# Patient Record
Sex: FEMALE | Race: WHITE | NOT HISPANIC OR LATINO | URBAN - METROPOLITAN AREA
[De-identification: names, ages, dates, MRNs, and addresses within clinical notes are randomized per-mention and may not be internally consistent; named-entity substitution may affect disease eponyms.]

---

## 2023-03-14 PROBLEM — R92.8 ABNORMAL MAMMOGRAM: Status: ACTIVE | Noted: 2023-03-14

## 2023-03-14 PROBLEM — F32.A DEPRESSION: Status: ACTIVE | Noted: 2023-03-14

## 2023-03-14 PROBLEM — F43.21 GRIEF REACTION: Status: ACTIVE | Noted: 2023-03-14

## 2023-03-14 PROBLEM — J45.909 ASTHMATIC BRONCHITIS (HHS-HCC): Status: ACTIVE | Noted: 2023-03-14

## 2023-03-14 PROBLEM — J32.9 CHRONIC SINUSITIS: Status: ACTIVE | Noted: 2023-03-14

## 2023-03-14 PROBLEM — J02.9 PHARYNGITIS: Status: ACTIVE | Noted: 2023-03-14

## 2023-03-14 PROBLEM — G47.00 INSOMNIA: Status: ACTIVE | Noted: 2023-03-14

## 2023-03-14 PROBLEM — N76.0 VAGINITIS: Status: ACTIVE | Noted: 2023-03-14

## 2023-03-14 PROBLEM — R63.4 WEIGHT LOSS, UNINTENTIONAL: Status: ACTIVE | Noted: 2023-03-14

## 2023-03-14 PROBLEM — M25.562 LEFT KNEE PAIN: Status: ACTIVE | Noted: 2023-03-14

## 2023-03-14 PROBLEM — R59.0 CERVICAL LYMPHADENOPATHY: Status: ACTIVE | Noted: 2023-03-14

## 2023-03-14 PROBLEM — F43.20 GRIEF REACTION: Status: ACTIVE | Noted: 2023-03-14

## 2023-03-14 PROBLEM — F41.9 ANXIETY: Status: ACTIVE | Noted: 2023-03-14

## 2023-03-14 PROBLEM — H69.93 DYSFUNCTION OF BOTH EUSTACHIAN TUBES: Status: ACTIVE | Noted: 2023-03-14

## 2023-03-14 PROBLEM — L98.9 CHANGING SKIN LESION: Status: ACTIVE | Noted: 2023-03-14

## 2023-03-14 PROBLEM — N39.3 STRESS INCONTINENCE, FEMALE: Status: ACTIVE | Noted: 2023-03-14

## 2023-03-14 PROBLEM — J11.1 INFLUENZA: Status: ACTIVE | Noted: 2023-03-14

## 2023-03-14 RX ORDER — CITALOPRAM 20 MG/1
0.5 TABLET, FILM COATED ORAL DAILY
COMMUNITY
Start: 2020-08-26 | End: 2024-04-18 | Stop reason: SDUPTHER

## 2023-03-14 RX ORDER — ZOLPIDEM TARTRATE 10 MG/1
1 TABLET ORAL NIGHTLY PRN
COMMUNITY
End: 2023-03-15 | Stop reason: SDUPTHER

## 2023-03-14 RX ORDER — FLUTICASONE PROPIONATE 50 MCG
2 SPRAY, SUSPENSION (ML) NASAL DAILY
COMMUNITY
Start: 2018-01-18

## 2023-03-14 RX ORDER — OXYBUTYNIN CHLORIDE 5 MG/1
5 TABLET, EXTENDED RELEASE ORAL DAILY
COMMUNITY
Start: 2022-01-05 | End: 2023-03-15 | Stop reason: SDUPTHER

## 2023-03-15 ENCOUNTER — OFFICE VISIT (OUTPATIENT)
Dept: PRIMARY CARE | Facility: CLINIC | Age: 63
End: 2023-03-15
Payer: COMMERCIAL

## 2023-03-15 VITALS
TEMPERATURE: 97.5 F | BODY MASS INDEX: 25.83 KG/M2 | WEIGHT: 155 LBS | HEIGHT: 65 IN | SYSTOLIC BLOOD PRESSURE: 138 MMHG | DIASTOLIC BLOOD PRESSURE: 88 MMHG | OXYGEN SATURATION: 97 % | HEART RATE: 94 BPM

## 2023-03-15 DIAGNOSIS — F51.01 PRIMARY INSOMNIA: Primary | ICD-10-CM

## 2023-03-15 DIAGNOSIS — F41.9 ANXIETY: ICD-10-CM

## 2023-03-15 DIAGNOSIS — N39.3 STRESS INCONTINENCE OF URINE: ICD-10-CM

## 2023-03-15 DIAGNOSIS — F33.42 RECURRENT MAJOR DEPRESSIVE DISORDER, IN FULL REMISSION (CMS-HCC): ICD-10-CM

## 2023-03-15 PROCEDURE — 80373 DRUG SCREENING TRAMADOL: CPT

## 2023-03-15 PROCEDURE — 80358 DRUG SCREENING METHADONE: CPT

## 2023-03-15 PROCEDURE — 80361 OPIATES 1 OR MORE: CPT

## 2023-03-15 PROCEDURE — 80365 DRUG SCREENING OXYCODONE: CPT

## 2023-03-15 PROCEDURE — 80307 DRUG TEST PRSMV CHEM ANLYZR: CPT

## 2023-03-15 PROCEDURE — 80354 DRUG SCREENING FENTANYL: CPT

## 2023-03-15 PROCEDURE — 80368 SEDATIVE HYPNOTICS: CPT

## 2023-03-15 PROCEDURE — 80346 BENZODIAZEPINES1-12: CPT

## 2023-03-15 PROCEDURE — 99214 OFFICE O/P EST MOD 30 MIN: CPT | Performed by: FAMILY MEDICINE

## 2023-03-15 RX ORDER — ZOLPIDEM TARTRATE 10 MG/1
10 TABLET ORAL NIGHTLY PRN
Qty: 30 TABLET | Refills: 2 | Status: SHIPPED | OUTPATIENT
Start: 2023-03-15 | End: 2023-03-15 | Stop reason: SDUPTHER

## 2023-03-15 RX ORDER — OXYBUTYNIN CHLORIDE 5 MG/1
5 TABLET, EXTENDED RELEASE ORAL DAILY
Qty: 30 TABLET | Refills: 2 | Status: SHIPPED | OUTPATIENT
Start: 2023-03-15 | End: 2023-06-13

## 2023-03-15 RX ORDER — ZOLPIDEM TARTRATE 10 MG/1
10 TABLET ORAL NIGHTLY PRN
Qty: 30 TABLET | Refills: 2 | Status: SHIPPED | OUTPATIENT
Start: 2023-03-15 | End: 2023-06-23 | Stop reason: SDUPTHER

## 2023-03-15 ASSESSMENT — LIFESTYLE VARIABLES
HOW OFTEN DO YOU HAVE A DRINK CONTAINING ALCOHOL: 2-4 TIMES A MONTH
HOW OFTEN DO YOU HAVE SIX OR MORE DRINKS ON ONE OCCASION: MONTHLY

## 2023-03-15 ASSESSMENT — PATIENT HEALTH QUESTIONNAIRE - PHQ9
1. LITTLE INTEREST OR PLEASURE IN DOING THINGS: NOT AT ALL
SUM OF ALL RESPONSES TO PHQ9 QUESTIONS 1 & 2: 0
2. FEELING DOWN, DEPRESSED OR HOPELESS: NOT AT ALL

## 2023-03-15 NOTE — PROGRESS NOTES
"Subjective   Patient ID: Gretchen Pelayo is a 62 y.o. female who presents for Follow-up (Medication refill).    HPI   She reports that she weaned herself off of citalopram and felt rage and anger. She restarted taking citalopram 10 mg and notes her mood is managed. She has a history of anxiety and depression from her mother. She felt numb when she was on 20 mg, but feels appropriate emotions at 10 mg. No side effects    She notes that she sees a difference in the generic brands of zolpidem. She has tried benadryl but it does not work as well.  She does not voice any side effects to Zolpidem or Oxybutynin. Oxybutinin she no longer takes daily, only when needs it for a function etc.  She is using zolpidem nightly or she does not sleep as well    Review of Systems    Objective   /88   Pulse 94   Temp 36.4 °C (97.5 °F)   Ht 1.651 m (5' 5\")   Wt 70.3 kg (155 lb)   SpO2 97%   BMI 25.79 kg/m²     Physical Exam  Constitutional:       Appearance: Normal appearance.   Cardiovascular:      Rate and Rhythm: Normal rate and regular rhythm.      Heart sounds: Normal heart sounds.   Pulmonary:      Effort: Pulmonary effort is normal.      Breath sounds: Normal breath sounds.   Musculoskeletal:      Cervical back: Neck supple.   Neurological:      Mental Status: She is alert.   Psychiatric:         Mood and Affect: Mood normal.         Thought Content: Thought content normal.         Judgment: Judgment normal.         Assessment/Plan   Depression and Anxiety  Continue on Citalopram 10 mg. Patient will call when she needs a refill.    Insomnia  Advised to make note in a log which generic brands of zolpidem work and which do not.   Continue on Zolpidem 10 mg. Prescription sent to pharmacy.   Urine Sample Collected for Drug Screen, CSA reviewed and signed  OARRS checked. Risk and benefit ratio assessed. No Suspicious activity.     Medication Refill: She continues on Oxybutynin 5 mg which controls her symptoms well. " Prescription sent to pharmacy.     Follow up in 3 months, unless a visit is needed prior.     Problem List Items Addressed This Visit          Nervous    Insomnia - Primary    Relevant Medications    zolpidem (Ambien) 10 mg tablet       Genitourinary    Stress incontinence of urine    Relevant Medications    oxybutynin XL (Ditropan-XL) 5 mg 24 hr tablet       Other    Anxiety    Relevant Orders    Drug Screen, Urine With Reflex to Confirmation    Opiate/Opioid/Benzo Extended Prescription Compliance    Depression    Relevant Orders    Drug Screen, Urine With Reflex to Confirmation    Opiate/Opioid/Benzo Extended Prescription Compliance     Scribe Attestation  By signing my name below, INneka Scribe   attest that this documentation has been prepared under the direction and in the presence of Jena Dave DO.

## 2023-03-15 NOTE — PATIENT INSTRUCTIONS
Depression and Anxiety  Continue on Citalopram 10 mg. Patient will call when she needs a refill.    Insomnia  Advised to make note in a log which generic brands of zolpidem work and which do not.   Continue on Zolpidem 10 mg. Prescription sent to pharmacy.   Urine Sample Collected for Drug Screen  OARRS checked. Risk and benefit ratio assessed. No Suspicious activity.     Medication Refill: She continues on Oxybutynin 5 mg which controls her symptoms well. Prescription sent to pharmacy.

## 2023-03-16 LAB
AMPHETAMINE (PRESENCE) IN URINE BY SCREEN METHOD: NORMAL
BARBITURATES PRESENCE IN URINE BY SCREEN METHOD: NORMAL
BENZODIAZEPINE (PRESENCE) IN URINE BY SCREEN METHOD: NORMAL
CANNABINOIDS IN URINE BY SCREEN METHOD: NORMAL
COCAINE (PRESENCE) IN URINE BY SCREEN METHOD: NORMAL
DRUG SCREEN COMMENT URINE: NORMAL
FENTANYL URINE: NORMAL
METHADONE (PRESENCE) IN URINE BY SCREEN METHOD: NORMAL
OPIATES (PRESENCE) IN URINE BY SCREEN METHOD: NORMAL
OXYCODONE (PRESENCE) IN URINE BY SCREEN METHOD: NORMAL
PHENCYCLIDINE (PRESENCE) IN URINE BY SCREEN METHOD: NORMAL

## 2023-03-20 LAB
6-ACETYLMORPHINE: <25 NG/ML
7-AMINOCLONAZEPAM: <25 NG/ML
ALPHA-HYDROXYALPRAZOLAM: <25 NG/ML
ALPHA-HYDROXYMIDAZOLAM: <25 NG/ML
ALPRAZOLAM: <25 NG/ML
AMPHETAMINE (PRESENCE) IN URINE BY SCREEN METHOD: ABNORMAL
BARBITURATES PRESENCE IN URINE BY SCREEN METHOD: ABNORMAL
CANNABINOIDS IN URINE BY SCREEN METHOD: ABNORMAL
CHLORDIAZEPOXIDE: <25 NG/ML
CLONAZEPAM: <25 NG/ML
COCAINE (PRESENCE) IN URINE BY SCREEN METHOD: ABNORMAL
CODEINE: <50 NG/ML
CREATINE, URINE FOR DRUG: 44 MG/DL
DIAZEPAM: <25 NG/ML
DRUG SCREEN COMMENT URINE: ABNORMAL
EDDP: <25 NG/ML
FENTANYL CONFIRMATION, URINE: <2.5 NG/ML
HYDROCODONE: <25 NG/ML
HYDROMORPHONE: <25 NG/ML
LORAZEPAM: <25 NG/ML
METHADONE CONFIRMATION,URINE: <25 NG/ML
MIDAZOLAM: <25 NG/ML
MORPHINE URINE: <50 NG/ML
NORDIAZEPAM: <25 NG/ML
NORFENTANYL: <2.5 NG/ML
NORHYDROCODONE: <25 NG/ML
NOROXYCODONE: <25 NG/ML
O-DESMETHYLTRAMADOL: <50 NG/ML
OXAZEPAM: <25 NG/ML
OXYCODONE: <25 NG/ML
OXYMORPHONE: <25 NG/ML
PHENCYCLIDINE (PRESENCE) IN URINE BY SCREEN METHOD: ABNORMAL
TEMAZEPAM: <25 NG/ML
TRAMADOL: <50 NG/ML
ZOLPIDEM METABOLITE (ZCA): 811 NG/ML
ZOLPIDEM: <25 NG/ML

## 2023-06-23 ENCOUNTER — OFFICE VISIT (OUTPATIENT)
Dept: PRIMARY CARE | Facility: CLINIC | Age: 63
End: 2023-06-23
Payer: COMMERCIAL

## 2023-06-23 VITALS
SYSTOLIC BLOOD PRESSURE: 114 MMHG | WEIGHT: 150 LBS | TEMPERATURE: 97.6 F | BODY MASS INDEX: 24.96 KG/M2 | HEART RATE: 84 BPM | DIASTOLIC BLOOD PRESSURE: 77 MMHG

## 2023-06-23 DIAGNOSIS — F51.01 PRIMARY INSOMNIA: ICD-10-CM

## 2023-06-23 DIAGNOSIS — F33.42 RECURRENT MAJOR DEPRESSIVE DISORDER, IN FULL REMISSION (CMS-HCC): ICD-10-CM

## 2023-06-23 DIAGNOSIS — F41.9 ANXIETY: Primary | ICD-10-CM

## 2023-06-23 PROCEDURE — 4004F PT TOBACCO SCREEN RCVD TLK: CPT | Performed by: FAMILY MEDICINE

## 2023-06-23 PROCEDURE — 99213 OFFICE O/P EST LOW 20 MIN: CPT | Performed by: FAMILY MEDICINE

## 2023-06-23 RX ORDER — ZOLPIDEM TARTRATE 10 MG/1
10 TABLET ORAL NIGHTLY PRN
Qty: 30 TABLET | Refills: 2 | Status: SHIPPED | OUTPATIENT
Start: 2023-06-23 | End: 2023-09-21 | Stop reason: SDUPTHER

## 2023-06-23 NOTE — PROGRESS NOTES
Subjective   Patient ID: Gretchen Pelayo is a 62 y.o. female who presents for FUV (MEDICATION FUV).    HPI pt is here for refill of sleeping pill.  She takes zolpidem 10 mg nightly.  It does help her sleep.  She gets very little sleep with out it.  She wakes up refreshed and no side effects w medication    She continues on 10 mg of citalopram.  It helps her mood and she denies side effects to this medication also    Review of Systems    Objective   /77   Pulse 84   Temp 36.4 °C (97.6 °F)   Wt 68 kg (150 lb)   BMI 24.96 kg/m²     Physical Exam  Constitutional:       Appearance: Normal appearance.   Cardiovascular:      Rate and Rhythm: Normal rate and regular rhythm.      Pulses: Normal pulses.      Heart sounds: Normal heart sounds.   Pulmonary:      Effort: Pulmonary effort is normal.      Breath sounds: Normal breath sounds.   Musculoskeletal:         General: No tenderness. Normal range of motion.      Cervical back: Normal range of motion.      Right lower leg: No edema.      Left lower leg: No edema.   Skin:     General: Skin is warm and dry.   Neurological:      Mental Status: She is alert and oriented to person, place, and time.   Psychiatric:         Mood and Affect: Mood normal.         Thought Content: Thought content normal.         Judgment: Judgment normal.         Assessment/Plan   Problem List Items Addressed This Visit       Anxiety - Primary     Controlled on present medication         Depression     Anxiety and depression in remission on Celexa, will continue and reevaluate at follow up         Insomnia     Doing well on medicaiton, refilled for 3 months.  CSA/UDS utd.  OARRs reviewed and no evidence of problems.  F/up 3 months         Relevant Medications    zolpidem (Ambien) 10 mg tablet

## 2023-06-23 NOTE — ASSESSMENT & PLAN NOTE
Doing well on medicaiton, refilled for 3 months.  CSA/UDS utd.  OARRs reviewed and no evidence of problems.  F/up 3 months

## 2023-09-20 DIAGNOSIS — F51.01 PRIMARY INSOMNIA: ICD-10-CM

## 2023-09-20 RX ORDER — ZOLPIDEM TARTRATE 10 MG/1
10 TABLET ORAL NIGHTLY PRN
Qty: 30 TABLET | Refills: 2 | OUTPATIENT
Start: 2023-09-20 | End: 2023-12-19

## 2023-09-21 ENCOUNTER — OFFICE VISIT (OUTPATIENT)
Dept: PRIMARY CARE | Facility: CLINIC | Age: 63
End: 2023-09-21
Payer: COMMERCIAL

## 2023-09-21 VITALS
HEIGHT: 65 IN | HEART RATE: 85 BPM | TEMPERATURE: 97.5 F | WEIGHT: 149 LBS | DIASTOLIC BLOOD PRESSURE: 82 MMHG | BODY MASS INDEX: 24.83 KG/M2 | OXYGEN SATURATION: 98 % | SYSTOLIC BLOOD PRESSURE: 117 MMHG

## 2023-09-21 DIAGNOSIS — F43.21 GRIEF REACTION: ICD-10-CM

## 2023-09-21 DIAGNOSIS — F17.200 TOBACCO USE DISORDER: ICD-10-CM

## 2023-09-21 DIAGNOSIS — F33.42 RECURRENT MAJOR DEPRESSIVE DISORDER, IN FULL REMISSION (CMS-HCC): ICD-10-CM

## 2023-09-21 DIAGNOSIS — F41.9 ANXIETY: ICD-10-CM

## 2023-09-21 DIAGNOSIS — F51.01 PRIMARY INSOMNIA: Primary | ICD-10-CM

## 2023-09-21 PROBLEM — E78.5 HYPERLIPIDEMIA: Status: ACTIVE | Noted: 2023-09-21

## 2023-09-21 PROBLEM — L82.1 OTHER SEBORRHEIC KERATOSIS: Status: ACTIVE | Noted: 2019-05-30

## 2023-09-21 PROBLEM — L82.0 INFLAMED SEBORRHEIC KERATOSIS: Status: ACTIVE | Noted: 2019-05-30

## 2023-09-21 PROCEDURE — 4004F PT TOBACCO SCREEN RCVD TLK: CPT | Performed by: FAMILY MEDICINE

## 2023-09-21 PROCEDURE — 99214 OFFICE O/P EST MOD 30 MIN: CPT | Performed by: FAMILY MEDICINE

## 2023-09-21 RX ORDER — ZOLPIDEM TARTRATE 10 MG/1
10 TABLET ORAL NIGHTLY PRN
Qty: 30 TABLET | Refills: 2 | Status: SHIPPED | OUTPATIENT
Start: 2023-09-21 | End: 2023-12-20 | Stop reason: SDUPTHER

## 2023-09-21 RX ORDER — ZOLPIDEM TARTRATE 10 MG/1
10 TABLET ORAL NIGHTLY PRN
Qty: 30 TABLET | Refills: 2 | Status: CANCELLED | OUTPATIENT
Start: 2023-09-21

## 2023-09-21 ASSESSMENT — PATIENT HEALTH QUESTIONNAIRE - PHQ9
1. LITTLE INTEREST OR PLEASURE IN DOING THINGS: NOT AT ALL
2. FEELING DOWN, DEPRESSED OR HOPELESS: NOT AT ALL
SUM OF ALL RESPONSES TO PHQ9 QUESTIONS 1 & 2: 0

## 2023-09-21 NOTE — PROGRESS NOTES
"Subjective   Patient ID: Gretchen Pelayo is a 63 y.o. female who presents for No chief complaint on file..    HPI pt is here for med refill.  No complaints,    She still misses her mother a lot.  She is busy with friends etc. And other family memebers    She exercises regualrly.  She is still smoking and does not want to quit    She feels celexa helps her, anxiety controlled.  She is not crying all the time etc.    She needs refill of ambien.  She uses it nightly to sleep.  She does not have side effects or wake up groggy.  Utd w CSA and UDS    Uses flonase ns for allergies, gets otc    Review of Systems    Objective   /82   Pulse 85   Temp 36.4 °C (97.5 °F)   Ht 1.651 m (5' 5\")   Wt 67.6 kg (149 lb)   SpO2 98%   BMI 24.79 kg/m²     Physical Exam  Constitutional:       Appearance: Normal appearance.   Cardiovascular:      Rate and Rhythm: Normal rate and regular rhythm.      Pulses: Normal pulses.      Heart sounds: Normal heart sounds.   Pulmonary:      Effort: Pulmonary effort is normal.      Breath sounds: Normal breath sounds.   Musculoskeletal:         General: No tenderness. Normal range of motion.      Cervical back: Normal range of motion.   Skin:     General: Skin is warm and dry.   Neurological:      Mental Status: She is alert and oriented to person, place, and time.   Psychiatric:         Mood and Affect: Mood normal.         Thought Content: Thought content normal.         Judgment: Judgment normal.         Assessment/Plan   Problem List Items Addressed This Visit       Anxiety    Depression    Grief reaction    Insomnia - Primary    Relevant Medications    zolpidem (Ambien) 10 mg tablet     Other Visit Diagnoses       Tobacco use disorder            Refilled celexa, helping her anxiety and depression.  Doing better w grief reaction also  Refilled ambien.  OARRs reviewed and no suspicious activity.  Pt is benefitting from medication w/o side effects    Discussed health maintenance " procedures today, pt not interested    3 month follow up planned or prn sooner

## 2023-10-01 ENCOUNTER — HOSPITAL ENCOUNTER (EMERGENCY)
Facility: HOSPITAL | Age: 63
Discharge: HOME | End: 2023-10-01
Attending: GENERAL PRACTICE
Payer: COMMERCIAL

## 2023-10-01 VITALS
WEIGHT: 150 LBS | TEMPERATURE: 98.9 F | BODY MASS INDEX: 24.99 KG/M2 | RESPIRATION RATE: 16 BRPM | DIASTOLIC BLOOD PRESSURE: 70 MMHG | HEART RATE: 74 BPM | HEIGHT: 65 IN | OXYGEN SATURATION: 100 % | SYSTOLIC BLOOD PRESSURE: 112 MMHG

## 2023-10-01 PROCEDURE — 99281 EMR DPT VST MAYX REQ PHY/QHP: CPT | Performed by: GENERAL PRACTICE

## 2023-10-01 ASSESSMENT — COLUMBIA-SUICIDE SEVERITY RATING SCALE - C-SSRS
1. IN THE PAST MONTH, HAVE YOU WISHED YOU WERE DEAD OR WISHED YOU COULD GO TO SLEEP AND NOT WAKE UP?: NO
6. HAVE YOU EVER DONE ANYTHING, STARTED TO DO ANYTHING, OR PREPARED TO DO ANYTHING TO END YOUR LIFE?: NO
2. HAVE YOU ACTUALLY HAD ANY THOUGHTS OF KILLING YOURSELF?: NO

## 2023-10-01 ASSESSMENT — PAIN - FUNCTIONAL ASSESSMENT: PAIN_FUNCTIONAL_ASSESSMENT: 0-10

## 2023-10-01 ASSESSMENT — PAIN SCALES - GENERAL: PAINLEVEL_OUTOF10: 8

## 2023-12-20 ENCOUNTER — OFFICE VISIT (OUTPATIENT)
Dept: PRIMARY CARE | Facility: CLINIC | Age: 63
End: 2023-12-20
Payer: COMMERCIAL

## 2023-12-20 VITALS
SYSTOLIC BLOOD PRESSURE: 138 MMHG | TEMPERATURE: 97.8 F | RESPIRATION RATE: 18 BRPM | OXYGEN SATURATION: 96 % | BODY MASS INDEX: 25.16 KG/M2 | WEIGHT: 151 LBS | DIASTOLIC BLOOD PRESSURE: 78 MMHG | HEIGHT: 65 IN | HEART RATE: 66 BPM

## 2023-12-20 DIAGNOSIS — F33.42 RECURRENT MAJOR DEPRESSIVE DISORDER, IN FULL REMISSION (CMS-HCC): ICD-10-CM

## 2023-12-20 DIAGNOSIS — F41.9 ANXIETY: Primary | ICD-10-CM

## 2023-12-20 DIAGNOSIS — F51.01 PRIMARY INSOMNIA: ICD-10-CM

## 2023-12-20 PROBLEM — J45.909 ASTHMATIC BRONCHITIS (HHS-HCC): Status: RESOLVED | Noted: 2023-03-14 | Resolved: 2023-12-20

## 2023-12-20 PROCEDURE — 4004F PT TOBACCO SCREEN RCVD TLK: CPT | Performed by: FAMILY MEDICINE

## 2023-12-20 PROCEDURE — 99213 OFFICE O/P EST LOW 20 MIN: CPT | Performed by: FAMILY MEDICINE

## 2023-12-20 RX ORDER — ZOLPIDEM TARTRATE 10 MG/1
10 TABLET ORAL NIGHTLY PRN
Qty: 30 TABLET | Refills: 2 | Status: SHIPPED | OUTPATIENT
Start: 2023-12-20 | End: 2024-04-18 | Stop reason: SDUPTHER

## 2023-12-20 ASSESSMENT — PAIN SCALES - GENERAL: PAINLEVEL: 0-NO PAIN

## 2023-12-20 ASSESSMENT — PATIENT HEALTH QUESTIONNAIRE - PHQ9
2. FEELING DOWN, DEPRESSED OR HOPELESS: NOT AT ALL
1. LITTLE INTEREST OR PLEASURE IN DOING THINGS: NOT AT ALL
SUM OF ALL RESPONSES TO PHQ9 QUESTIONS 1 AND 2: 0

## 2023-12-20 NOTE — PROGRESS NOTES
"Subjective   Patient ID: Gretchen Pelayo is a 63 y.o. female who presents for 3 month f/up.    HPI   The patient presents to the clinic for a 3-month follow-up. She has past medical history of chronic sinusitis, anxiety, depression, and insomnia.    She has been compliant with medication and denies any side-effects to current medication.    She continues taking citalopram and zolpidem medication. She states that both medications are working well and she denies side-effects to either medication.  Anxiety and depression s/s in remission.  She states needs Ambien to help her sleep at night or she wakes up and tosses and turns all night.      Continues smoking, not ready to quit.  Denies sob and lung s/s    Review of Systems    Objective   /78   Pulse 66   Temp 36.6 °C (97.8 °F)   Resp 18   Ht 1.651 m (5' 5\")   Wt 68.5 kg (151 lb)   SpO2 96%   BMI 25.13 kg/m²     Physical Exam    Assessment/Plan   Problem List Items Addressed This Visit             ICD-10-CM    Anxiety - Primary F41.9    Depression F32.A    Insomnia G47.00          The patient received a refill for her zolpidem 10 mg medication. Continue taking 1 tablet (10 mg) at night as needed to treat symptoms of insomnia.  OARRs checked and no suspicious activityOARRS:  No data recorded  I have personally reviewed the OARRS report for Gretchen Pelayo. I have considered the risks of abuse, dependence, addiction and diversion and I believe that it is clinically appropriate for Gretchen Pelayo to be prescribed this medication    Is the patient prescribed a combination of a benzodiazepine and opioid?  Yes, I feel it is clincially indicated to continue the medication and have discussed with the patient risks/benefits/alternatives.    Last Urine Drug Screen / ordered today: No  Recent Results (from the past 8760 hour(s))   OPIATE/OPIOID/BENZO PRESCRIPTION COMPLIANCE    Collection Time: 03/15/23 12:00 AM   Result Value Ref Range    DRUG SCREEN " COMMENT URINE SEE BELOW     Creatine, Urine 44.0 mg/dL    Amphetamine Screen, Urine PRESUMPTIVE NEGATIVE NEGATIVE    Barbiturate Screen, Urine PRESUMPTIVE NEGATIVE NEGATIVE    Cannabinoid Screen, Urine PRESUMPTIVE NEGATIVE NEGATIVE    Cocaine Screen, Urine PRESUMPTIVE NEGATIVE NEGATIVE    PCP Screen, Urine PRESUMPTIVE NEGATIVE NEGATIVE    7-Aminoclonazepam <25 Cutoff <25 ng/mL    Alpha-Hydroxyalprazolam <25 Cutoff <25 ng/mL    Alpha-Hydroxymidazolam <25 Cutoff <25 ng/mL    Alprazolam <25 Cutoff <25 ng/mL    Chlordiazepoxide <25 Cutoff <25 ng/mL    Clonazepam <25 Cutoff <25 ng/mL    Diazepam <25 Cutoff <25 ng/mL    Lorazepam <25 Cutoff <25 ng/mL    Midazolam <25 Cutoff <25 ng/mL    Nordiazepam <25 Cutoff <25 ng/mL    Oxazepam <25 Cutoff <25 ng/mL    Temazepam <25 Cutoff <25 ng/mL    Zolpidem <25 Cutoff <25 ng/mL    Zolpidem Metabolite (ZCA) 811 (A) Cutoff <25 ng/mL    6-Acetylmorphine <25 Cutoff <25 ng/mL    Codeine <50 Cutoff <50 ng/mL    Hydrocodone <25 Cutoff <25 ng/mL    Hydromorphone <25 Cutoff <25 ng/mL    Morphine Urine <50 Cutoff <50 ng/mL    Norhydrocodone <25 Cutoff <25 ng/mL    Noroxycodone <25 Cutoff <25 ng/mL    Oxycodone <25 Cutoff <25 ng/mL    Oxymorphone <25 Cutoff <25 ng/mL    Tramadol <50 Cutoff <50 ng/mL    O-Desmethyltramadol <50 Cutoff <50 ng/mL    Fentanyl <2.5 Cutoff<2.5 ng/mL    Norfentanyl <2.5 Cutoff<2.5 ng/mL    METHADONE CONFIRMATION,URINE <25 Cutoff <25 ng/mL    EDDP <25 Cutoff <25 ng/mL   Drug Screen, Urine With Reflex to Confirmation    Collection Time: 03/15/23 12:00 AM   Result Value Ref Range    DRUG SCREEN COMMENT URINE SEE BELOW     Amphetamine Screen, Urine PRESUMPTIVE NEGATIVE NEGATIVE    Barbiturate Screen, Urine PRESUMPTIVE NEGATIVE NEGATIVE    BENZODIAZEPINE (PRESENCE) IN URINE BY SCREEN METHOD PRESUMPTIVE NEGATIVE NEGATIVE    Cannabinoid Screen, Urine PRESUMPTIVE NEGATIVE NEGATIVE    Cocaine Screen, Urine PRESUMPTIVE NEGATIVE NEGATIVE    Fentanyl, Ur PRESUMPTIVE NEGATIVE  NEGATIVE    Methadone Screen, Urine PRESUMPTIVE NEGATIVE NEGATIVE    Opiate Screen, Urine PRESUMPTIVE NEGATIVE NEGATIVE    Oxycodone Screen, Ur PRESUMPTIVE NEGATIVE NEGATIVE    PCP Screen, Urine PRESUMPTIVE NEGATIVE NEGATIVE     Results are as expected.         Controlled Substance Agreement:  Date of the Last Agreement: 3/15/2023  Reviewed Controlled Substance Agreement including but not limited to the benefits, risks, and alternatives to treatment with a Controlled Substance medication(s).    Sleep Aids:   What is the patient's goal of therapy? Improved sleep to feel rested  Is this being achieved with current treatment? yes    Activities of Daily Living:   Is your overall impression that this patient is benefiting (symptom reduction outweighs side effects) from sleep aid therapy? Yes     1. Physical Functioning: Better  2. Family Relationship: Better  3. Social Relationship: Better  4. Mood: Better  5. Sleep Patterns: Better  6. Overall Function: Better      She will follow-up in 3 months, unless otherwise needed.    Scribe Attestation  By signing my name below, ITom Scribe   attest that this documentation has been prepared under the direction and in the presence of eJna Dave DO.

## 2024-04-18 ENCOUNTER — OFFICE VISIT (OUTPATIENT)
Dept: PRIMARY CARE | Facility: CLINIC | Age: 64
End: 2024-04-18
Payer: COMMERCIAL

## 2024-04-18 VITALS
WEIGHT: 150 LBS | RESPIRATION RATE: 16 BRPM | HEIGHT: 65 IN | DIASTOLIC BLOOD PRESSURE: 82 MMHG | SYSTOLIC BLOOD PRESSURE: 130 MMHG | OXYGEN SATURATION: 96 % | BODY MASS INDEX: 24.99 KG/M2 | HEART RATE: 71 BPM | TEMPERATURE: 97.9 F

## 2024-04-18 DIAGNOSIS — F33.9 RECURRENT MAJOR DEPRESSIVE DISORDER, REMISSION STATUS UNSPECIFIED (CMS-HCC): ICD-10-CM

## 2024-04-18 DIAGNOSIS — E78.5 HYPERLIPIDEMIA, UNSPECIFIED HYPERLIPIDEMIA TYPE: ICD-10-CM

## 2024-04-18 DIAGNOSIS — Z72.0 TOBACCO USE: ICD-10-CM

## 2024-04-18 DIAGNOSIS — Z12.31 VISIT FOR SCREENING MAMMOGRAM: ICD-10-CM

## 2024-04-18 DIAGNOSIS — F41.9 ANXIETY: ICD-10-CM

## 2024-04-18 DIAGNOSIS — Z78.9 ALCOHOL USE: ICD-10-CM

## 2024-04-18 DIAGNOSIS — F51.01 PRIMARY INSOMNIA: Primary | ICD-10-CM

## 2024-04-18 PROCEDURE — 80307 DRUG TEST PRSMV CHEM ANLYZR: CPT

## 2024-04-18 PROCEDURE — 99213 OFFICE O/P EST LOW 20 MIN: CPT | Performed by: FAMILY MEDICINE

## 2024-04-18 RX ORDER — CITALOPRAM 20 MG/1
10 TABLET, FILM COATED ORAL DAILY
Qty: 45 TABLET | Refills: 1 | Status: SHIPPED | OUTPATIENT
Start: 2024-04-18

## 2024-04-18 RX ORDER — ZOLPIDEM TARTRATE 10 MG/1
10 TABLET ORAL NIGHTLY PRN
Qty: 30 TABLET | Refills: 2 | Status: SHIPPED | OUTPATIENT
Start: 2024-04-18 | End: 2024-07-17

## 2024-04-18 ASSESSMENT — PATIENT HEALTH QUESTIONNAIRE - PHQ9
SUM OF ALL RESPONSES TO PHQ9 QUESTIONS 1 AND 2: 0
1. LITTLE INTEREST OR PLEASURE IN DOING THINGS: NOT AT ALL
2. FEELING DOWN, DEPRESSED OR HOPELESS: NOT AT ALL

## 2024-04-18 ASSESSMENT — PAIN SCALES - GENERAL: PAINLEVEL: 0-NO PAIN

## 2024-04-18 NOTE — PROGRESS NOTES
"Subjective   Patient ID: Gretchen Pelayo is a 63 y.o. female who presents for Med Refill.    HPI   The patient presents to the clinic for medication refills. She has past medical history of hyperlipidemia, chronic sinusitis, cholelithiasis, stress incontinence of urine, anxiety, depression, left knee pain, insomnia, and cervical lymphadenopathy.    She continues on citalopram 20 mg (0.5 tablet or 10 mg once daily) for treatment of anxiety/depression. Her mood has been good. Her anxiety/depression symptoms have been controlled with medication. She denies any side-effects to citalopram medication.    She also continues on Ambien 10 mg nightly PRN for treatment of insomnia. She states that her Ambien medication is working well. She denies any side-effects to her Ambien medication.    She also takes Flonase 50 mcg/actuation nasal spray (2 spray daily) for treatment of chronic sinusitis. She states that she purchases this medication over-the-counter.    The patient reports that she contracted COVID-19 infection recently (March 2024). Her symptoms were mostly coughing and dizziness. These symptoms lasted for approximately ~1 week. Her symptoms have now resolved (confirmed with 2 negative COVID-19 tests). She received her most recent COVID-19 vaccine in December 2021.    The patient states that she has never received a flu vaccine in her life.     Review of Systems    Objective   /82   Pulse 71   Temp 36.6 °C (97.9 °F)   Resp 16   Ht 1.651 m (5' 5\")   Wt 68 kg (150 lb)   SpO2 96%   BMI 24.96 kg/m²     Physical Exam  Constitutional:       Appearance: Normal appearance.   Cardiovascular:      Rate and Rhythm: Normal rate and regular rhythm.      Pulses: Normal pulses.      Heart sounds: Normal heart sounds.   Pulmonary:      Effort: Pulmonary effort is normal.      Breath sounds: Normal breath sounds.   Musculoskeletal:         General: Normal range of motion.      Cervical back: Normal range of motion.      " Right lower leg: No edema.      Left lower leg: No edema.   Skin:     General: Skin is warm and dry.   Neurological:      Mental Status: She is alert and oriented to person, place, and time.   Psychiatric:         Mood and Affect: Mood normal.         Thought Content: Thought content normal.         Judgment: Judgment normal.         Assessment/Plan   Problem List Items Addressed This Visit             ICD-10-CM    Anxiety F41.9    Relevant Medications    citalopram (CeleXA) 20 mg tablet    Depression F32.A    Relevant Medications    citalopram (CeleXA) 20 mg tablet    Other Relevant Orders    Drug Screen, Urine With Reflex to Confirmation    Benzodiazepine Screen, Urine    Insomnia - Primary G47.00    Relevant Medications    zolpidem (Ambien) 10 mg tablet    Hyperlipidemia E78.5     Other Visit Diagnoses         Codes    Visit for screening mammogram     Z12.31    Alcohol use     Z78.9    Tobacco use     Z72.0               The patient received refills for current medication (citalopram 20 mg, Ambien 10 mg). She was instructed to continue taking medication as previously directed. In regards to her Ambien prescription, OARRS were reviewed. Risk and benefit ratio was assessed. No suspicious activity was observed. A UDS/CSA was conducted on the patient in the clinic today.    Discussed labs and health maintenance procedures/imaging etc,    She will follow-up in 3 months, unless otherwise needed.     Scribe Attestation  By signing my name below, I, Tom Coh , Tr   attest that this documentation has been prepared under the direction and in the presence of Jena Dave DO.

## 2024-04-19 LAB
AMPHETAMINES UR QL SCN: NORMAL
BARBITURATES UR QL SCN: NORMAL
BENZODIAZ UR QL SCN: NORMAL
BENZODIAZ UR QL SCN: NORMAL
BZE UR QL SCN: NORMAL
CANNABINOIDS UR QL SCN: NORMAL
FENTANYL+NORFENTANYL UR QL SCN: NORMAL
METHADONE UR QL SCN: NORMAL
OPIATES UR QL SCN: NORMAL
OXYCODONE+OXYMORPHONE UR QL SCN: NORMAL
PCP UR QL SCN: NORMAL

## 2024-06-27 ENCOUNTER — TELEPHONE (OUTPATIENT)
Dept: PRIMARY CARE | Facility: CLINIC | Age: 64
End: 2024-06-27
Payer: COMMERCIAL

## 2024-06-27 DIAGNOSIS — F51.01 PRIMARY INSOMNIA: ICD-10-CM

## 2024-06-27 RX ORDER — ZOLPIDEM TARTRATE 10 MG/1
10 TABLET ORAL NIGHTLY PRN
Qty: 30 TABLET | Refills: 0 | Status: SHIPPED | OUTPATIENT
Start: 2024-06-27 | End: 2024-07-27

## 2024-06-27 RX ORDER — ZOLPIDEM TARTRATE 10 MG/1
10 TABLET ORAL NIGHTLY PRN
Qty: 30 TABLET | Refills: 0 | Status: SHIPPED | OUTPATIENT
Start: 2024-06-27 | End: 2024-06-27

## 2024-06-27 NOTE — TELEPHONE ENCOUNTER
PHARMACIST CALLED STATING THERE IS NO DAY SUPPLY ON THE PATIENTS ZOLPIDEM SCRIPT. CAN YOU ADD AND RE-SEND.

## 2024-07-01 ENCOUNTER — TELEPHONE (OUTPATIENT)
Dept: PRIMARY CARE | Facility: CLINIC | Age: 64
End: 2024-07-01
Payer: COMMERCIAL

## 2024-07-01 DIAGNOSIS — F51.01 PRIMARY INSOMNIA: ICD-10-CM

## 2024-07-01 RX ORDER — ZOLPIDEM TARTRATE 10 MG/1
10 TABLET ORAL NIGHTLY PRN
Qty: 30 TABLET | Refills: 0 | Status: SHIPPED | OUTPATIENT
Start: 2024-07-01 | End: 2024-07-10 | Stop reason: SDUPTHER

## 2024-07-01 NOTE — TELEPHONE ENCOUNTER
PHARMACIST CALLED BACK STATING IT STILL ISNT COMING THROUGH ON THEIR END W THE DAY SUPPLY AND THEY CANNOT FILL IT WITHOUT ONE SO IF YOU CAN SEND A NEW SCRIPT W THE DAY SUPPLY WRITTEN AT THE END OF THE SIG LINE OR IN THE RX NOTE PLEASE. (FOR THE ZOLPIDEM)

## 2024-07-10 ENCOUNTER — OFFICE VISIT (OUTPATIENT)
Dept: PRIMARY CARE | Facility: CLINIC | Age: 64
End: 2024-07-10
Payer: COMMERCIAL

## 2024-07-10 VITALS
DIASTOLIC BLOOD PRESSURE: 77 MMHG | OXYGEN SATURATION: 96 % | HEIGHT: 65 IN | RESPIRATION RATE: 16 BRPM | BODY MASS INDEX: 24.66 KG/M2 | SYSTOLIC BLOOD PRESSURE: 123 MMHG | TEMPERATURE: 97.9 F | WEIGHT: 148 LBS | HEART RATE: 59 BPM

## 2024-07-10 DIAGNOSIS — F33.42 RECURRENT MAJOR DEPRESSIVE DISORDER, IN FULL REMISSION (CMS-HCC): ICD-10-CM

## 2024-07-10 DIAGNOSIS — F51.01 PRIMARY INSOMNIA: Primary | ICD-10-CM

## 2024-07-10 DIAGNOSIS — F41.9 ANXIETY: ICD-10-CM

## 2024-07-10 PROCEDURE — 4004F PT TOBACCO SCREEN RCVD TLK: CPT | Performed by: FAMILY MEDICINE

## 2024-07-10 PROCEDURE — 99213 OFFICE O/P EST LOW 20 MIN: CPT | Performed by: FAMILY MEDICINE

## 2024-07-10 RX ORDER — ZOLPIDEM TARTRATE 10 MG/1
10 TABLET ORAL NIGHTLY PRN
Qty: 30 TABLET | Refills: 2 | Status: SHIPPED | OUTPATIENT
Start: 2024-07-10

## 2024-07-10 ASSESSMENT — PATIENT HEALTH QUESTIONNAIRE - PHQ9
2. FEELING DOWN, DEPRESSED OR HOPELESS: NOT AT ALL
SUM OF ALL RESPONSES TO PHQ9 QUESTIONS 1 AND 2: 0
1. LITTLE INTEREST OR PLEASURE IN DOING THINGS: NOT AT ALL

## 2024-07-10 ASSESSMENT — PAIN SCALES - GENERAL: PAINLEVEL: 0-NO PAIN

## 2024-07-10 NOTE — PROGRESS NOTES
"Subjective   Patient ID: Gretchen Pelayo is a 63 y.o. female who presents for Medicare Annual Wellness Visit Subsequent and Med Refill.    HPI   The patient presents to the clinic for medication refills. She has past medical history of hyperlipidemia, chronic sinusitis, cholelithiasis, stress incontinence of urine, anxiety, depression, left knee pain, insomnia, and cervical lymphadenopathy.    She denies any major medical issues/concerns at this time.    She continues on zolpidem 10 mg nightly PRN for treatment of insomnia. She has been sleeping well on this medication. She denies any side-effects to her zolpidem medication.  She is not due for refill until July 19th but had to come in early due to schedule    She is a current smoker. She continues to smoke 0.5 pack/day.  She states she cut back from ppd and does not want to quit or decrease any further    She is doing well on antidepressant, anxiety and depression symptoms are controlled, no side effects    Review of Systems    Objective   /77   Pulse 59   Temp 36.6 °C (97.9 °F)   Resp 16   Ht 1.651 m (5' 5\")   Wt 67.1 kg (148 lb)   SpO2 96%   BMI 24.63 kg/m²     Physical Exam  Neck:      Vascular: No carotid bruit.   Cardiovascular:      Rate and Rhythm: Normal rate and regular rhythm.      Pulses: Normal pulses.      Heart sounds: Normal heart sounds.   Pulmonary:      Effort: Pulmonary effort is normal.      Breath sounds: Normal breath sounds.   Musculoskeletal:         General: Normal range of motion.      Cervical back: Normal range of motion.      Right lower leg: No edema.      Left lower leg: No edema.   Skin:     General: Skin is warm and dry.   Neurological:      Mental Status: She is alert and oriented to person, place, and time.   Psychiatric:         Mood and Affect: Mood normal.         Thought Content: Thought content normal.         Judgment: Judgment normal.         Assessment/Plan   Problem List Items Addressed This Visit          "    ICD-10-CM    Anxiety F41.9    Depression F32.A    Insomnia - Primary G47.00          In regards to her insomnia, the patient received a refill for her zolpidem 10 mg prescription. She was instructed to continue taking zolpidem 10 mg medication as previously directed. OARRS were reviewed. Risk to benefit ratio was assessed. No suspicious activity was observed. Her most recent UDS/CSA was done in April 2024. Continue monitoring insomnia symptoms for improvement/exacerbation.    Smoking cessation strategies were briefly discussed with the patient. She is not ready to quit smoking at this time.    Anxiety and depression controlled, no med changes, doing well    She will follow-up in 3 months, unless otherwise needed.    Scribe Attestation  By signing my name below, I, Tr Covarrubias   attest that this documentation has been prepared under the direction and in the presence of Jena Dave DO.

## 2024-08-07 ENCOUNTER — APPOINTMENT (OUTPATIENT)
Dept: PRIMARY CARE | Facility: CLINIC | Age: 64
End: 2024-08-07
Payer: COMMERCIAL

## 2024-09-04 ENCOUNTER — OFFICE VISIT (OUTPATIENT)
Dept: PRIMARY CARE | Facility: CLINIC | Age: 64
End: 2024-09-04
Payer: COMMERCIAL

## 2024-09-04 VITALS
HEART RATE: 72 BPM | WEIGHT: 147 LBS | BODY MASS INDEX: 23.07 KG/M2 | OXYGEN SATURATION: 96 % | HEIGHT: 67 IN | DIASTOLIC BLOOD PRESSURE: 78 MMHG | TEMPERATURE: 98.7 F | SYSTOLIC BLOOD PRESSURE: 131 MMHG

## 2024-09-04 DIAGNOSIS — B37.9 CANDIDA INFECTION: ICD-10-CM

## 2024-09-04 DIAGNOSIS — J01.00 ACUTE NON-RECURRENT MAXILLARY SINUSITIS: Primary | ICD-10-CM

## 2024-09-04 DIAGNOSIS — J01.00 ACUTE NON-RECURRENT MAXILLARY SINUSITIS: ICD-10-CM

## 2024-09-04 PROCEDURE — 4004F PT TOBACCO SCREEN RCVD TLK: CPT | Performed by: INTERNAL MEDICINE

## 2024-09-04 PROCEDURE — 3008F BODY MASS INDEX DOCD: CPT | Performed by: INTERNAL MEDICINE

## 2024-09-04 PROCEDURE — 99213 OFFICE O/P EST LOW 20 MIN: CPT | Performed by: INTERNAL MEDICINE

## 2024-09-04 RX ORDER — AMOXICILLIN AND CLAVULANATE POTASSIUM 875; 125 MG/1; MG/1
875 TABLET, FILM COATED ORAL 2 TIMES DAILY
Qty: 14 TABLET | Refills: 0 | Status: SHIPPED | OUTPATIENT
Start: 2024-09-04 | End: 2024-09-11

## 2024-09-04 RX ORDER — FLUCONAZOLE 150 MG/1
150 TABLET ORAL ONCE
Qty: 1 TABLET | Refills: 0 | Status: SHIPPED | OUTPATIENT
Start: 2024-09-04 | End: 2024-09-04

## 2024-09-04 RX ORDER — AMOXICILLIN AND CLAVULANATE POTASSIUM 875; 125 MG/1; MG/1
875 TABLET, FILM COATED ORAL 2 TIMES DAILY
Qty: 14 TABLET | Refills: 0 | Status: SHIPPED | OUTPATIENT
Start: 2024-09-04 | End: 2024-09-04

## 2024-09-04 NOTE — PROGRESS NOTES
"PCP: Jena Dave, DO   I have reviewed existing histories, notes, past medical history, surgical history, social history, family history, med list, allergy list, and immunization, and updated.    HPI:   For 10 days URI symptoms, now sinus pressure, yellow mucus.  No Fever and chills  Tested negative for covid twice.    No results found for: \"WBC\", \"HGB\", \"HCT\", \"PLT\", \"CHOL\", \"TRIG\", \"HDL\", \"LDLDIRECT\", \"ALT\", \"AST\", \"NA\", \"K\", \"CL\", \"CREATININE\", \"BUN\", \"CO2\", \"TSH\", \"PSA\", \"INR\", \"GLUF\", \"HGBA1C\", \"ALBUR\"  Physical exam:  /78   Pulse 72   Temp 37.1 °C (98.7 °F)   Ht 1.702 m (5' 7\")   Wt 66.7 kg (147 lb)   SpO2 96%   BMI 23.02 kg/m²     TM normal  Throat slight erythema  Sinus pressure  Lungs mostly clear rare wheezing  Heart RR  No leg edema      Assessments/orders:   Assessment & Plan  Acute non-recurrent maxillary sinusitis    Orders:    amoxicillin-pot clavulanate (Augmentin) 875-125 mg tablet; Take 1 tablet (875 mg) by mouth 2 times a day for 10 days.    Candida infection  She said she always gets yeast infection with atb. Therefore fluconazole  Orders:    fluconazole (Diflucan) 150 mg tablet; Take 1 tablet (150 mg) by mouth 1 time for 1 dose.                      "

## 2024-10-09 ENCOUNTER — APPOINTMENT (OUTPATIENT)
Dept: PRIMARY CARE | Facility: CLINIC | Age: 64
End: 2024-10-09
Payer: COMMERCIAL

## 2024-10-09 VITALS
RESPIRATION RATE: 18 BRPM | SYSTOLIC BLOOD PRESSURE: 128 MMHG | DIASTOLIC BLOOD PRESSURE: 68 MMHG | BODY MASS INDEX: 24.11 KG/M2 | HEIGHT: 66 IN | OXYGEN SATURATION: 99 % | TEMPERATURE: 97.7 F | HEART RATE: 72 BPM | WEIGHT: 150 LBS

## 2024-10-09 DIAGNOSIS — F41.9 ANXIETY: ICD-10-CM

## 2024-10-09 DIAGNOSIS — F33.9 RECURRENT MAJOR DEPRESSIVE DISORDER, REMISSION STATUS UNSPECIFIED (CMS-HCC): ICD-10-CM

## 2024-10-09 DIAGNOSIS — F51.01 PRIMARY INSOMNIA: ICD-10-CM

## 2024-10-09 PROCEDURE — 3008F BODY MASS INDEX DOCD: CPT | Performed by: FAMILY MEDICINE

## 2024-10-09 PROCEDURE — 4004F PT TOBACCO SCREEN RCVD TLK: CPT | Performed by: FAMILY MEDICINE

## 2024-10-09 PROCEDURE — 99213 OFFICE O/P EST LOW 20 MIN: CPT | Performed by: FAMILY MEDICINE

## 2024-10-09 RX ORDER — CITALOPRAM 20 MG/1
10 TABLET, FILM COATED ORAL DAILY
Qty: 45 TABLET | Refills: 1 | Status: SHIPPED | OUTPATIENT
Start: 2024-10-09

## 2024-10-09 RX ORDER — ZOLPIDEM TARTRATE 10 MG/1
10 TABLET ORAL NIGHTLY PRN
Qty: 30 TABLET | Refills: 2 | Status: SHIPPED | OUTPATIENT
Start: 2024-10-09

## 2024-10-09 ASSESSMENT — PAIN SCALES - GENERAL: PAINLEVEL: 0-NO PAIN

## 2024-10-09 NOTE — PROGRESS NOTES
"Subjective   Patient ID: Gretchen Pelayo is a 64 y.o. female who presents for 3 month f/up.    HPI   The patient presents to the clinic for a 3-month follow-up. She has past medical history of hyperlipidemia, chronic sinusitis, cholelithiasis, stress incontinence of urine, anxiety, depression, left knee pain, insomnia, and cervical lymphadenopathy.    She denies any major medical issues/concerns at this time.     She continues on zolpidem 10 mg nightly PRN for treatment of insomnia. She has been sleeping well on this medication. She denies any side-effects to her zolpidem medication.      She takes citalopram 10 mg daily for treatment of depression/anxiety. Her mood has been good on citalopram medication and her depression/anxiety symptoms have been controlled. She denies any side-effects to her citalopram medication.    She recalls that she had been suffering from a sinus infection in early September 2024. She consulted with Dr. Lewis (primary care) at the time and she received a prescription for Augmentin 875-125 mg (BID for 10 days). She responded well to Augmentin medication and her symptoms were resolved.    She declines to have any preventative screenings (mammogram, colonoscopy, etc) and/or immunizations in the clinic today and/or near future.    Review of Systems    Objective   /68   Pulse 72   Temp 36.5 °C (97.7 °F)   Resp 18   Ht 1.676 m (5' 6\")   Wt 68 kg (150 lb)   SpO2 99%   BMI 24.21 kg/m²     Physical Exam  Cardiovascular:      Rate and Rhythm: Normal rate and regular rhythm.      Pulses: Normal pulses.      Heart sounds: Normal heart sounds.   Pulmonary:      Effort: Pulmonary effort is normal.      Breath sounds: Normal breath sounds.   Musculoskeletal:         General: Normal range of motion.      Cervical back: Normal range of motion.      Right lower leg: No edema.      Left lower leg: No edema.   Skin:     General: Skin is warm and dry.   Neurological:      Mental Status: She is " alert and oriented to person, place, and time.   Psychiatric:         Mood and Affect: Mood normal.         Behavior: Behavior normal.         Thought Content: Thought content normal.         Judgment: Judgment normal.         Assessment/Plan   Problem List Items Addressed This Visit             ICD-10-CM    Anxiety F41.9    Relevant Medications    citalopram (CeleXA) 20 mg tablet    Depression F32.A    Relevant Medications    citalopram (CeleXA) 20 mg tablet    Insomnia G47.00    Relevant Medications    zolpidem (Ambien) 10 mg tablet          The patient received refills for current medications (citalopram 20 mg, zolpidem 10 mg). She was instructed to continue taking medication as previously directed. In regards to her zolpidem prescription, OARRS were reviewed. Risk to benefit ratio was assessed. No suspicious activity was observed. Her most recent UDS/CSA was done on 04/18/2024.    Discussed health maintenance testing/vaccines etc, pt does not want anything ordered.    She will follow-up in 3 months, unless otherwise needed.    Scribe Attestation  By signing my name below, I, Tom Cho , Tr   attest that this documentation has been prepared under the direction and in the presence of Jena Dave DO.

## 2025-01-08 ENCOUNTER — APPOINTMENT (OUTPATIENT)
Dept: PRIMARY CARE | Facility: CLINIC | Age: 65
End: 2025-01-08
Payer: COMMERCIAL

## 2025-01-08 DIAGNOSIS — F33.9 RECURRENT MAJOR DEPRESSIVE DISORDER, REMISSION STATUS UNSPECIFIED (CMS-HCC): ICD-10-CM

## 2025-01-08 DIAGNOSIS — F41.9 ANXIETY: ICD-10-CM

## 2025-01-08 DIAGNOSIS — F51.01 PRIMARY INSOMNIA: ICD-10-CM

## 2025-01-08 DIAGNOSIS — E78.5 HYPERLIPIDEMIA, UNSPECIFIED HYPERLIPIDEMIA TYPE: Primary | ICD-10-CM

## 2025-01-08 PROCEDURE — 99214 OFFICE O/P EST MOD 30 MIN: CPT | Performed by: FAMILY MEDICINE

## 2025-01-08 RX ORDER — ZOLPIDEM TARTRATE 10 MG/1
10 TABLET ORAL NIGHTLY PRN
Qty: 30 TABLET | Refills: 2 | Status: SHIPPED | OUTPATIENT
Start: 2025-01-08

## 2025-01-08 RX ORDER — CITALOPRAM 20 MG/1
20 TABLET, FILM COATED ORAL DAILY
Qty: 90 TABLET | Refills: 1 | Status: SHIPPED | OUTPATIENT
Start: 2025-01-08

## 2025-01-08 NOTE — PROGRESS NOTES
Subjective   Patient ID: Gretchen Pelayo is a 64 y.o. female who presents for 3 month f/up.    HPI   The patient presents to the clinic, via virtual visit, for a 3-month follow-up. She has past medical history of hyperlipidemia, chronic sinusitis, cholelithiasis, stress incontinence of urine, anxiety, depression, left knee pain, insomnia, and cervical lymphadenopathy.    She continues on zolpidem 10 mg nightly PRN for treatment of insomnia. She has been sleeping well on this medication. She denies any side-effects to her zolpidem medication.  She states last filled 12/18 and requesting 3 month rf     She takes citalopram 20 mg daily for treatment of depression/anxiety. Her mood has been good on citalopram medication and her depression/anxiety symptoms have been controlled. She denies any side-effects to her citalopram medication.  She was on 10 mg 1/2 of 20 mg) but she states over past 6 weeks went back up to 20 mg since had close friend's  pass away and a neighbor and has been difficult.  She has felt better on 20 mg, has had more difficulty in winter than in summer also and feels this helps that also.  No side effects w increased dose    She declines to have any preventative screenings (mammogram, colonoscopy, etc) today and/or in the near future.  Has not had labs, has hx of hyperlipidemia    Continues w cigarets, not ready to quit    Review of Systems    Objective   There were no vitals taken for this visit.    Physical Exam  Constitutional:       Appearance: Normal appearance.   Pulmonary:      Effort: Pulmonary effort is normal.   Musculoskeletal:      Cervical back: Normal range of motion.   Neurological:      Mental Status: She is alert and oriented to person, place, and time.   Psychiatric:         Mood and Affect: Mood normal.         Behavior: Behavior normal.         Thought Content: Thought content normal.         Judgment: Judgment normal.       Assessment/Plan   Problem List Items Addressed  This Visit             ICD-10-CM    Anxiety F41.9    Relevant Medications    citalopram (CeleXA) 20 mg tablet    Depression F32.A    Relevant Medications    citalopram (CeleXA) 20 mg tablet    Insomnia G47.00    Relevant Medications    zolpidem (Ambien) 10 mg tablet    Hyperlipidemia - Primary E78.5          The patient received refills for current medication (citalopram 20 mg, zolpidem 10 mg). She was instructed to continue taking medication as previously directed. In regards to her zolpidem prescription, [place]ARRS were reviewed. Risk to benefit ratio was assessed. No suspicious activity was observed. Her most recent UDS/CSA was done on 04/18/2024.    Discussed light therapy    Discussed labs and preventive screenings    She will follow-up in 3 months, unless otherwise needed.    Scribe Attestation  By signing my name below, ITom Scribe   attest that this documentation has been prepared under the direction and in the presence of Jena Dave DO.

## 2025-04-07 ENCOUNTER — APPOINTMENT (OUTPATIENT)
Dept: PRIMARY CARE | Facility: CLINIC | Age: 65
End: 2025-04-07
Payer: COMMERCIAL

## 2025-04-07 VITALS
BODY MASS INDEX: 24.69 KG/M2 | SYSTOLIC BLOOD PRESSURE: 112 MMHG | WEIGHT: 153 LBS | OXYGEN SATURATION: 96 % | DIASTOLIC BLOOD PRESSURE: 72 MMHG | HEART RATE: 82 BPM | TEMPERATURE: 98 F

## 2025-04-07 DIAGNOSIS — F51.01 PRIMARY INSOMNIA: Primary | ICD-10-CM

## 2025-04-07 DIAGNOSIS — Z79.899 HIGH RISK MEDICATION USE: ICD-10-CM

## 2025-04-07 PROCEDURE — 99213 OFFICE O/P EST LOW 20 MIN: CPT | Performed by: FAMILY MEDICINE

## 2025-04-07 ASSESSMENT — PAIN SCALES - GENERAL: PAINLEVEL_OUTOF10: 0-NO PAIN

## 2025-04-07 NOTE — PATIENT INSTRUCTIONS
Controlled substance agreement signed.  Urine drug screen ordered (MUST BE DONE PRIOR TO LEAVING THE OFFICE).  Call PCP next month for medication refill.    F/U w/PCP.

## 2025-04-07 NOTE — PROGRESS NOTES
Subjective   Patient ID: Gretchen Pelayo is a 64 y.o. female who presents for Med Refill.    HPI  Has Insomnia.  Condition(s) stable.  Taking med(s) as directed.  Does not need med refill at this time (has 1 refill available at the pharmacy to ).  Will need med refill next month.  Will need UDS and controlled substance agreement updated before med refill next month.    I have personally reviewed the OARRS report.  I have considered the risks of abuse, dependence, addiction, and diversion.    Are there any concerns: No.  Is the patient prescribed a combination of a benzodiazepine and opoid: No.    Date of last controlled substance agreement 4/18/24..  Date of last urine drug screen 4/18/24 (UDS negative).    Category of medication prescribed today:  Sleep aids.    What are the patient's goal of therapy: Symptom control.  Is this being achieved with the current therapy: Yes.    Review of Systems  No other complaints.     Objective   /72 (BP Location: Left arm, Patient Position: Sitting, BP Cuff Size: Adult)   Pulse 82   Temp 36.7 °C (98 °F) (Temporal)   Wt 69.4 kg (153 lb)   SpO2 96%   BMI 24.69 kg/m²     Physical Exam  Constitutional:       General: She is not in acute distress.     Appearance: She is normal weight.   Neurological:      Mental Status: She is oriented to person, place, and time.   Psychiatric:         Mood and Affect: Mood normal.         Behavior: Behavior normal.     Assessment/Plan   Diagnoses and all orders for this visit:  Primary insomnia  -     Drug Screen, Urine With Reflex to Confirmation  High risk medication use  -     Drug Screen, Urine With Reflex to Confirmation    Controlled substance agreement signed.  Urine drug screen ordered (MUST BE DONE PRIOR TO LEAVING THE OFFICE).  Call PCP next month for medication refill.    F/U w/PCP.

## 2025-04-08 LAB
AMPHETAMINES UR QL: NEGATIVE NG/ML
BARBITURATES UR QL: NEGATIVE NG/ML
BENZODIAZ UR QL: NEGATIVE NG/ML
BZE UR QL: NEGATIVE NG/ML
CREAT UR-MCNC: 80.5 MG/DL
FENTANYL UR QL SCN: NEGATIVE NG/ML
METHADONE UR QL: NEGATIVE NG/ML
OPIATES UR QL: NEGATIVE NG/ML
OXIDANTS UR QL: NEGATIVE MCG/ML
OXYCODONE UR QL: NEGATIVE NG/ML
PCP UR QL: NEGATIVE NG/ML
PH UR: 5.2 [PH] (ref 4.5–9)
QUEST NOTES AND COMMENTS: NORMAL
THC UR QL: NEGATIVE NG/ML

## 2025-05-13 ENCOUNTER — TELEPHONE (OUTPATIENT)
Dept: PRIMARY CARE | Facility: CLINIC | Age: 65
End: 2025-05-13
Payer: COMMERCIAL

## 2025-05-13 NOTE — TELEPHONE ENCOUNTER
Medication Name: zolpidem   Dose: 10mg   Frequency: nightly prn  Quantity left:  Pharmacy: [facility]     Last appointment: 4/7/25  Last CPE:  Last MCW:  Next appointment:8/13/25  Next CPE:  Next MCW:

## 2025-05-14 DIAGNOSIS — F51.01 PRIMARY INSOMNIA: ICD-10-CM

## 2025-05-14 RX ORDER — ZOLPIDEM TARTRATE 10 MG/1
10 TABLET ORAL NIGHTLY PRN
Qty: 30 TABLET | Refills: 2 | Status: SHIPPED | OUTPATIENT
Start: 2025-05-14

## 2025-08-06 DIAGNOSIS — Z12.31 ENCOUNTER FOR SCREENING MAMMOGRAM FOR BREAST CANCER: ICD-10-CM

## 2025-08-13 ENCOUNTER — APPOINTMENT (OUTPATIENT)
Dept: PRIMARY CARE | Facility: CLINIC | Age: 65
End: 2025-08-13
Payer: COMMERCIAL

## 2025-08-15 ENCOUNTER — APPOINTMENT (OUTPATIENT)
Dept: PRIMARY CARE | Facility: CLINIC | Age: 65
End: 2025-08-15
Payer: COMMERCIAL

## 2025-08-15 VITALS
SYSTOLIC BLOOD PRESSURE: 134 MMHG | BODY MASS INDEX: 23.57 KG/M2 | TEMPERATURE: 98 F | WEIGHT: 146 LBS | HEART RATE: 67 BPM | DIASTOLIC BLOOD PRESSURE: 80 MMHG | OXYGEN SATURATION: 98 %

## 2025-08-15 DIAGNOSIS — F41.9 ANXIETY: ICD-10-CM

## 2025-08-15 DIAGNOSIS — F33.9 RECURRENT MAJOR DEPRESSIVE DISORDER, REMISSION STATUS UNSPECIFIED: ICD-10-CM

## 2025-08-15 DIAGNOSIS — F51.01 PRIMARY INSOMNIA: ICD-10-CM

## 2025-08-15 DIAGNOSIS — F51.01 PRIMARY INSOMNIA: Primary | ICD-10-CM

## 2025-08-15 DIAGNOSIS — E78.5 HYPERLIPIDEMIA, UNSPECIFIED HYPERLIPIDEMIA TYPE: ICD-10-CM

## 2025-08-15 PROCEDURE — 99213 OFFICE O/P EST LOW 20 MIN: CPT | Performed by: FAMILY MEDICINE

## 2025-08-15 RX ORDER — ZOLPIDEM TARTRATE 10 MG/1
10 TABLET ORAL NIGHTLY PRN
Qty: 30 TABLET | Refills: 2 | Status: SHIPPED | OUTPATIENT
Start: 2025-08-15

## 2025-08-15 RX ORDER — CITALOPRAM 20 MG/1
20 TABLET ORAL DAILY
Qty: 90 TABLET | Refills: 1 | Status: SHIPPED | OUTPATIENT
Start: 2025-08-15

## 2025-08-15 RX ORDER — ZOLPIDEM TARTRATE 10 MG/1
10 TABLET ORAL NIGHTLY PRN
Qty: 30 TABLET | Refills: 2 | Status: SHIPPED | OUTPATIENT
Start: 2025-08-15 | End: 2025-08-15 | Stop reason: SDUPTHER

## 2025-11-07 ENCOUNTER — APPOINTMENT (OUTPATIENT)
Dept: PRIMARY CARE | Facility: CLINIC | Age: 65
End: 2025-11-07
Payer: COMMERCIAL